# Patient Record
Sex: FEMALE | Race: ASIAN | URBAN - METROPOLITAN AREA
[De-identification: names, ages, dates, MRNs, and addresses within clinical notes are randomized per-mention and may not be internally consistent; named-entity substitution may affect disease eponyms.]

---

## 2022-12-16 ENCOUNTER — OFFICE VISIT (OUTPATIENT)
Dept: OBGYN CLINIC | Facility: CLINIC | Age: 42
End: 2022-12-16
Payer: COMMERCIAL

## 2022-12-16 VITALS
HEIGHT: 64 IN | HEART RATE: 71 BPM | DIASTOLIC BLOOD PRESSURE: 77 MMHG | SYSTOLIC BLOOD PRESSURE: 116 MMHG | BODY MASS INDEX: 26.46 KG/M2 | WEIGHT: 155 LBS

## 2022-12-16 DIAGNOSIS — Z01.419 WELL WOMAN EXAM WITH ROUTINE GYNECOLOGICAL EXAM: Primary | ICD-10-CM

## 2022-12-16 DIAGNOSIS — Z31.41 FERTILITY TESTING: ICD-10-CM

## 2022-12-16 DIAGNOSIS — N94.10 DYSPAREUNIA IN FEMALE: ICD-10-CM

## 2022-12-16 PROCEDURE — 87624 HPV HI-RISK TYP POOLED RSLT: CPT

## 2022-12-16 PROCEDURE — 3078F DIAST BP <80 MM HG: CPT

## 2022-12-16 PROCEDURE — 99204 OFFICE O/P NEW MOD 45 MIN: CPT

## 2022-12-16 PROCEDURE — 3074F SYST BP LT 130 MM HG: CPT

## 2022-12-16 PROCEDURE — 3008F BODY MASS INDEX DOCD: CPT

## 2022-12-16 NOTE — PROGRESS NOTES
Jose Pathak is a 38year old female  Patient's last menstrual period was 2022 (exact date). Patient presents with:  New Patient: Patient was late and is filling out paperwork   Wellness Visit  Other: Discuss future pregnancy   . Pt has been with same male partner for over 10 years. She has not had sexual intercourse with him. She has tried and it is too painful. She is interested in getting pregnant. Discussed with her pelvic floor therapy and fertility options. OBSTETRICS HISTORY:  OB History    Para Term  AB Living   0 0 0 0 0 0   SAB IAB Ectopic Multiple Live Births   0 0 0 0 0       GYNE HISTORY:  Periods regular monthly every 28-30 days, lasting every 5-7 days. Sexual activity: Yes   Partners: Male        Pap Date:  (might be two years ago)        MEDICAL HISTORY:  History reviewed. No pertinent past medical history. SURGICAL HISTORY:  History reviewed. No pertinent surgical history. SOCIAL HISTORY:  Social History    Socioeconomic History      Marital status: Not on file      Spouse name: Not on file      Number of children: Not on file      Years of education: Not on file      Highest education level: Not on file    Occupational History      Not on file    Tobacco Use      Smoking status: Never      Smokeless tobacco: Never    Vaping Use      Vaping Use: Never used    Substance and Sexual Activity      Alcohol use: Yes        Comment: socially      Drug use: Never      Sexual activity: Yes        Partners: Male    Other Topics      Concerns:        Not on file    Social History Narrative      Not on file    Social Determinants of Health  Financial Resource Strain: Not on file  Food Insecurity: Not on file  Transportation Needs: Not on file  Physical Activity: Not on file  Stress: Not on file  Social Connections: Not on file  Intimate Partner Violence: Not on file  Housing Stability: Not on file    FAMILY HISTORY:  History reviewed.  No pertinent family history. MEDICATIONS:  No current outpatient medications on file. ALLERGIES:    Insect Parts            SHORTNESS OF BREATH, SWELLING    Comment:COCKROACH  Sulfa Antibiotics       SHORTNESS OF BREATH    Comment:touching tabs while mixing meds ? ???  Latex                   RASH    Comment:gloves      PHYSICAL EXAM:   Pelvic Exam:  External Genitalia: normal appearance, hair distribution, and no lesions  Urethral Meatus:  normal in size, location, without lesions and prolapse  Bladder:  No fullness, masses or tenderness  Vagina:  Normal appearance without lesions, no abnormal discharge  Cervix:  Normal without tenderness on motion  Uterus: normal in size, contour, position, mobility, without tenderness  Adnexa: normal without masses or tenderness  Perineum: normal  Anus: no hemorroids     Assessment & Plan:    . Well woman exam with routine gynecological exam    - HPV HIGH RISK , THIN PREP COLLECTION; Future  - THINPREP PAP SMEAR B; Future  - CBC WITH DIFFERENTIAL WITH PLATELET; Future  - COMP METABOLIC PANEL (14); Future  - LIPID PANEL; Future  - ASSAY, THYROID STIM HORMONE; Future  - VITAMIN D, 25-HYDROXY; Future  - [facility] JIGNESH 2D+3D SCREENING BILAT (CPT=77067/89860); Future  - HPV HIGH RISK , THIN PREP COLLECTION  - THINPREP PAP SMEAR B    2. Fertility testing    - [facility]; Future  - LH (LUTEINIZING HORMONE); Future  - ANTI-MULLERIAN HORMONE; Future    3.  Dyspareunia in female    - [address] [place] [address]

## 2022-12-19 LAB — HPV I/H RISK 1 DNA SPEC QL NAA+PROBE: NEGATIVE

## 2022-12-29 ENCOUNTER — LAB ENCOUNTER (OUTPATIENT)
Dept: LAB | Age: 42
End: 2022-12-29
Payer: COMMERCIAL

## 2022-12-29 DIAGNOSIS — Z31.41 FERTILITY TESTING: ICD-10-CM

## 2022-12-29 DIAGNOSIS — Z01.419 WELL WOMAN EXAM WITH ROUTINE GYNECOLOGICAL EXAM: ICD-10-CM

## 2022-12-29 LAB
ALBUMIN SERPL-MCNC: 4.1 G/DL (ref 3.4–5)
ALBUMIN/GLOB SERPL: 1.2 {RATIO} (ref 1–2)
ALP LIVER SERPL-CCNC: 50 U/L
ALT SERPL-CCNC: 27 U/L
ANION GAP SERPL CALC-SCNC: 5 MMOL/L (ref 0–18)
AST SERPL-CCNC: 16 U/L (ref 15–37)
BASOPHILS # BLD AUTO: 0.04 X10(3) UL (ref 0–0.2)
BASOPHILS NFR BLD AUTO: 0.8 %
BILIRUB SERPL-MCNC: 0.6 MG/DL (ref 0.1–2)
BUN BLD-MCNC: 11 MG/DL (ref 7–18)
BUN/CREAT SERPL: 16.7 (ref 10–20)
CALCIUM BLD-MCNC: 9.3 MG/DL (ref 8.5–10.1)
CHLORIDE SERPL-SCNC: 105 MMOL/L (ref 98–112)
CHOLEST SERPL-MCNC: 159 MG/DL (ref ?–200)
CO2 SERPL-SCNC: 31 MMOL/L (ref 21–32)
CREAT BLD-MCNC: 0.66 MG/DL
DEPRECATED RDW RBC AUTO: 41.7 FL (ref 35.1–46.3)
EOSINOPHIL # BLD AUTO: 0.04 X10(3) UL (ref 0–0.7)
EOSINOPHIL NFR BLD AUTO: 0.8 %
ERYTHROCYTE [DISTWIDTH] IN BLOOD BY AUTOMATED COUNT: 12.3 % (ref 11–15)
FASTING PATIENT LIPID ANSWER: YES
FASTING STATUS PATIENT QL REPORTED: YES
FSH SERPL-ACNC: 6.3 MIU/ML
GFR SERPLBLD BASED ON 1.73 SQ M-ARVRAT: 114 ML/MIN/1.73M2 (ref 60–?)
GLOBULIN PLAS-MCNC: 3.4 G/DL (ref 2.8–4.4)
GLUCOSE BLD-MCNC: 93 MG/DL (ref 70–99)
HCT VFR BLD AUTO: 43 %
HDLC SERPL-MCNC: 69 MG/DL (ref 40–59)
HGB BLD-MCNC: 14 G/DL
IMM GRANULOCYTES # BLD AUTO: 0.01 X10(3) UL (ref 0–1)
IMM GRANULOCYTES NFR BLD: 0.2 %
LDLC SERPL CALC-MCNC: 70 MG/DL (ref ?–100)
LH SERPL-ACNC: 4.6 MIU/ML
LYMPHOCYTES # BLD AUTO: 1.45 X10(3) UL (ref 1–4)
LYMPHOCYTES NFR BLD AUTO: 29.2 %
MCH RBC QN AUTO: 29.9 PG (ref 26–34)
MCHC RBC AUTO-ENTMCNC: 32.6 G/DL (ref 31–37)
MCV RBC AUTO: 91.9 FL
MONOCYTES # BLD AUTO: 0.24 X10(3) UL (ref 0.1–1)
MONOCYTES NFR BLD AUTO: 4.8 %
NEUTROPHILS # BLD AUTO: 3.19 X10 (3) UL (ref 1.5–7.7)
NEUTROPHILS # BLD AUTO: 3.19 X10(3) UL (ref 1.5–7.7)
NEUTROPHILS NFR BLD AUTO: 64.2 %
NONHDLC SERPL-MCNC: 90 MG/DL (ref ?–130)
OSMOLALITY SERPL CALC.SUM OF ELEC: 291 MOSM/KG (ref 275–295)
PLATELET # BLD AUTO: 299 10(3)UL (ref 150–450)
POTASSIUM SERPL-SCNC: 4.2 MMOL/L (ref 3.5–5.1)
PROT SERPL-MCNC: 7.5 G/DL (ref 6.4–8.2)
RBC # BLD AUTO: 4.68 X10(6)UL
SODIUM SERPL-SCNC: 141 MMOL/L (ref 136–145)
TRIGL SERPL-MCNC: 114 MG/DL (ref 30–149)
TSI SER-ACNC: 1.89 MIU/ML (ref 0.36–3.74)
VIT D+METAB SERPL-MCNC: 24 NG/ML (ref 30–100)
VLDLC SERPL CALC-MCNC: 17 MG/DL (ref 0–30)
WBC # BLD AUTO: 5 X10(3) UL (ref 4–11)

## 2022-12-29 PROCEDURE — 80053 COMPREHEN METABOLIC PANEL: CPT

## 2022-12-29 PROCEDURE — 83002 ASSAY OF GONADOTROPIN (LH): CPT

## 2022-12-29 PROCEDURE — 82306 VITAMIN D 25 HYDROXY: CPT

## 2022-12-29 PROCEDURE — 85025 COMPLETE CBC W/AUTO DIFF WBC: CPT

## 2022-12-29 PROCEDURE — 80061 LIPID PANEL: CPT

## 2022-12-29 PROCEDURE — 84443 ASSAY THYROID STIM HORMONE: CPT

## 2022-12-29 PROCEDURE — 83520 IMMUNOASSAY QUANT NOS NONAB: CPT

## 2022-12-29 PROCEDURE — 83001 ASSAY OF GONADOTROPIN (FSH): CPT

## 2022-12-31 LAB — ANTI-MULLERIAN HORMONE: 5.63 NG/ML

## 2023-01-06 ENCOUNTER — OFFICE VISIT (OUTPATIENT)
Dept: FAMILY MEDICINE CLINIC | Facility: CLINIC | Age: 43
End: 2023-01-06
Payer: COMMERCIAL

## 2023-01-06 VITALS — HEIGHT: 65.5 IN | BODY MASS INDEX: 24.85 KG/M2 | WEIGHT: 151 LBS

## 2023-01-06 DIAGNOSIS — Z00.00 WELLNESS EXAMINATION: Primary | ICD-10-CM

## 2023-01-06 DIAGNOSIS — N94.10 DYSPAREUNIA IN FEMALE: ICD-10-CM

## 2023-01-06 DIAGNOSIS — L81.9 HYPOPIGMENTATION: ICD-10-CM

## 2023-01-06 PROBLEM — M25.569 KNEE PAIN: Status: ACTIVE | Noted: 2021-05-11

## 2023-01-06 PROBLEM — M76.829 TIBIALIS TENDINITIS: Status: ACTIVE | Noted: 2021-05-11

## 2023-01-06 PROBLEM — E55.9 VITAMIN D DEFICIENCY: Status: ACTIVE | Noted: 2021-05-12

## 2023-01-06 PROBLEM — H00.019 HORDEOLUM EXTERNUM: Status: ACTIVE | Noted: 2021-05-11

## 2023-01-06 PROBLEM — H52.13 MYOPIA OF BOTH EYES: Status: ACTIVE | Noted: 2021-07-09

## 2023-01-06 PROCEDURE — 99386 PREV VISIT NEW AGE 40-64: CPT | Performed by: STUDENT IN AN ORGANIZED HEALTH CARE EDUCATION/TRAINING PROGRAM

## 2023-01-06 PROCEDURE — 3008F BODY MASS INDEX DOCD: CPT | Performed by: STUDENT IN AN ORGANIZED HEALTH CARE EDUCATION/TRAINING PROGRAM

## 2023-01-06 RX ORDER — CHOLECALCIFEROL (VITAMIN D3) 1250 MCG
CAPSULE ORAL
COMMUNITY

## 2023-01-06 RX ORDER — UBIDECARENONE 75 MG
250 CAPSULE ORAL DAILY
COMMUNITY

## 2023-01-06 NOTE — PATIENT INSTRUCTIONS
Ob/gyn  - Dr. Marquise Albrecht   - Dr. Sima Pozo and [facility] Woody Sawyer Dr. Jaden Crespo

## 2023-01-10 ENCOUNTER — MED REC SCAN ONLY (OUTPATIENT)
Dept: FAMILY MEDICINE CLINIC | Facility: CLINIC | Age: 43
End: 2023-01-10

## 2023-01-17 ENCOUNTER — TELEPHONE (OUTPATIENT)
Facility: CLINIC | Age: 43
End: 2023-01-17

## 2023-03-14 ENCOUNTER — LAB ENCOUNTER (OUTPATIENT)
Dept: LAB | Age: 43
End: 2023-03-14
Attending: OBSTETRICS & GYNECOLOGY
Payer: COMMERCIAL

## 2023-03-14 ENCOUNTER — OFFICE VISIT (OUTPATIENT)
Dept: OBGYN CLINIC | Facility: CLINIC | Age: 43
End: 2023-03-14

## 2023-03-14 VITALS
SYSTOLIC BLOOD PRESSURE: 120 MMHG | HEIGHT: 65.5 IN | BODY MASS INDEX: 25 KG/M2 | HEART RATE: 80 BPM | DIASTOLIC BLOOD PRESSURE: 76 MMHG

## 2023-03-14 DIAGNOSIS — O20.9 BLEEDING IN EARLY PREGNANCY: ICD-10-CM

## 2023-03-14 DIAGNOSIS — O20.9 BLEEDING IN EARLY PREGNANCY: Primary | ICD-10-CM

## 2023-03-14 LAB
B-HCG SERPL-ACNC: 2557 MIU/ML
PROGEST SERPL-MCNC: 6.1 NG/ML
RH BLOOD TYPE: POSITIVE

## 2023-03-14 PROCEDURE — 86900 BLOOD TYPING SEROLOGIC ABO: CPT

## 2023-03-14 PROCEDURE — 84702 CHORIONIC GONADOTROPIN TEST: CPT

## 2023-03-14 PROCEDURE — 86901 BLOOD TYPING SEROLOGIC RH(D): CPT

## 2023-03-14 PROCEDURE — 3074F SYST BP LT 130 MM HG: CPT | Performed by: OBSTETRICS & GYNECOLOGY

## 2023-03-14 PROCEDURE — 99203 OFFICE O/P NEW LOW 30 MIN: CPT | Performed by: OBSTETRICS & GYNECOLOGY

## 2023-03-14 PROCEDURE — 84144 ASSAY OF PROGESTERONE: CPT

## 2023-03-14 PROCEDURE — 3078F DIAST BP <80 MM HG: CPT | Performed by: OBSTETRICS & GYNECOLOGY

## 2023-03-15 ENCOUNTER — TELEPHONE (OUTPATIENT)
Dept: OBGYN CLINIC | Facility: CLINIC | Age: 43
End: 2023-03-15

## 2023-03-15 DIAGNOSIS — O20.9 BLEEDING IN EARLY PREGNANCY: Primary | ICD-10-CM

## 2023-03-15 RX ORDER — PROGESTERONE 200 MG/1
200 CAPSULE ORAL DAILY
Qty: 90 CAPSULE | Refills: 1 | Status: SHIPPED | OUTPATIENT
Start: 2023-03-15

## 2023-03-15 RX ORDER — PROGESTERONE 200 MG/1
200 CAPSULE ORAL AS DIRECTED
Qty: 90 CAPSULE | Refills: 1 | Status: SHIPPED | OUTPATIENT
Start: 2023-03-15 | End: 2023-03-15

## 2023-03-15 NOTE — TELEPHONE ENCOUNTER
Spoke with patient regarding lab results. Appointment scheduled for OV Friday. No further concerns at this time.     Morteza Fuentes

## 2023-03-15 NOTE — TELEPHONE ENCOUNTER
Spoke with pharmacy. rx missing frequency of progesterone. Reynold Rather informed. New rx will be sent in.

## 2023-03-16 ENCOUNTER — LAB ENCOUNTER (OUTPATIENT)
Dept: LAB | Age: 43
End: 2023-03-16
Attending: OBSTETRICS & GYNECOLOGY
Payer: COMMERCIAL

## 2023-03-16 DIAGNOSIS — O20.9 BLEEDING IN EARLY PREGNANCY: ICD-10-CM

## 2023-03-16 LAB
B-HCG SERPL-ACNC: 2854 MIU/ML
PROGEST SERPL-MCNC: 31.8 NG/ML

## 2023-03-16 PROCEDURE — 84144 ASSAY OF PROGESTERONE: CPT

## 2023-03-16 PROCEDURE — 84702 CHORIONIC GONADOTROPIN TEST: CPT

## 2023-03-17 ENCOUNTER — OFFICE VISIT (OUTPATIENT)
Dept: OBGYN CLINIC | Facility: CLINIC | Age: 43
End: 2023-03-17

## 2023-03-17 VITALS — WEIGHT: 152.19 LBS | DIASTOLIC BLOOD PRESSURE: 65 MMHG | BODY MASS INDEX: 25 KG/M2 | SYSTOLIC BLOOD PRESSURE: 103 MMHG

## 2023-03-17 DIAGNOSIS — O20.9 BLEEDING IN EARLY PREGNANCY: Primary | ICD-10-CM

## 2023-03-21 ENCOUNTER — TELEPHONE (OUTPATIENT)
Dept: OBGYN CLINIC | Facility: CLINIC | Age: 43
End: 2023-03-21

## 2023-03-21 NOTE — TELEPHONE ENCOUNTER
Called and left voicemail for patient regarding having repeat HCG and progesterone drawn. Labs are entered.

## 2023-03-21 NOTE — TELEPHONE ENCOUNTER
----- Message from Carlos Hartman MD sent at 3/18/2023 12:25 PM CDT -----  Repeat hcg and progesterone on Tuesday Carlos Hartman MD

## 2023-03-23 ENCOUNTER — PATIENT MESSAGE (OUTPATIENT)
Dept: OBGYN CLINIC | Facility: CLINIC | Age: 43
End: 2023-03-23

## 2023-03-23 ENCOUNTER — LAB ENCOUNTER (OUTPATIENT)
Dept: LAB | Age: 43
End: 2023-03-23
Attending: OBSTETRICS & GYNECOLOGY
Payer: COMMERCIAL

## 2023-03-23 DIAGNOSIS — O20.9 BLEEDING IN EARLY PREGNANCY: ICD-10-CM

## 2023-03-23 LAB
B-HCG SERPL-ACNC: 2816 MIU/ML
PROGEST SERPL-MCNC: 73.7 NG/ML

## 2023-03-23 PROCEDURE — 84144 ASSAY OF PROGESTERONE: CPT

## 2023-03-23 PROCEDURE — 84702 CHORIONIC GONADOTROPIN TEST: CPT

## 2023-03-24 ENCOUNTER — PATIENT MESSAGE (OUTPATIENT)
Dept: OBGYN CLINIC | Facility: CLINIC | Age: 43
End: 2023-03-24

## 2023-03-24 ENCOUNTER — TELEPHONE (OUTPATIENT)
Dept: OBGYN CLINIC | Facility: CLINIC | Age: 43
End: 2023-03-24

## 2023-03-24 DIAGNOSIS — O20.9 BLEEDING IN EARLY PREGNANCY: Primary | ICD-10-CM

## 2023-03-24 RX ORDER — MISOPROSTOL 200 UG/1
200 TABLET ORAL 4 TIMES DAILY
Qty: 8 TABLET | Refills: 0 | Status: SHIPPED | OUTPATIENT
Start: 2023-03-24

## 2023-03-24 NOTE — TELEPHONE ENCOUNTER
Patient name and  verified. Patient informed of TA result note and recommendations. Reviewed Cytotec dosing and administration with patient. Standing order for bhcg lab entered. All questions answered.

## 2023-03-24 NOTE — TELEPHONE ENCOUNTER
From: Ronda Hadley  To: Abrahan Almonte MD  Sent: 3/24/2023 8:17 AM CDT  Subject: Cytotec medication not at pharmacy    Dear Dr. Cornelius Sagastume,    Good morning. Thank you for taking care of me . I really appreciate your help during this difficult time. I have a question about the cytotec treatment. I got a message from your office about my hCG level not rising and Cytotec treatment is necessary. I was told to go to the pharmacy to . I believed this is urgent so I went to the pharmacy but the pharmacy didn't have any prescription from you yesterday. I wonder if there is a mistake and what is the safest and most logical timeline for me to start this treatment process without affecting my work and life. Thank you very much for your help.      Sincerely,  Bacilio Mota

## 2023-04-05 ENCOUNTER — LAB ENCOUNTER (OUTPATIENT)
Dept: LAB | Age: 43
End: 2023-04-05
Attending: OBSTETRICS & GYNECOLOGY
Payer: COMMERCIAL

## 2023-04-05 DIAGNOSIS — O20.9 BLEEDING IN EARLY PREGNANCY: ICD-10-CM

## 2023-04-05 LAB — B-HCG SERPL-ACNC: 13 MIU/ML

## 2023-04-05 PROCEDURE — 84702 CHORIONIC GONADOTROPIN TEST: CPT

## 2023-04-10 ENCOUNTER — TELEPHONE (OUTPATIENT)
Dept: OBGYN CLINIC | Facility: CLINIC | Age: 43
End: 2023-04-10

## 2023-04-10 DIAGNOSIS — O02.1 MISSED ABORTION: Primary | ICD-10-CM

## 2023-04-10 NOTE — TELEPHONE ENCOUNTER
----- Message from Lisa Vázquez MD sent at 4/6/2023  5:08 PM CDT -----  Dropping HCG -  Have patient repeat HCG weekly till < 2.     Lisa Vázquez MD

## 2023-04-10 NOTE — TELEPHONE ENCOUNTER
Called and spoke with patient and informed her HCG is dropping and for her to have blood drawn(HCG) again on Wednesday. Informed patient we will continue to monitor until her numbers are less than 2. Patient verbalized understanding.

## 2023-06-13 ENCOUNTER — TELEPHONE (OUTPATIENT)
Dept: FAMILY MEDICINE CLINIC | Facility: CLINIC | Age: 43
End: 2023-06-13

## 2023-06-13 ENCOUNTER — E-VISIT (OUTPATIENT)
Dept: TELEHEALTH | Age: 43
End: 2023-06-13
Payer: COMMERCIAL

## 2023-06-13 ENCOUNTER — PATIENT MESSAGE (OUTPATIENT)
Dept: FAMILY MEDICINE CLINIC | Facility: CLINIC | Age: 43
End: 2023-06-13

## 2023-06-13 DIAGNOSIS — L73.9 FOLLICULITIS: Primary | ICD-10-CM

## 2023-06-13 DIAGNOSIS — L50.9 HIVES: Primary | ICD-10-CM

## 2023-06-13 PROCEDURE — 99422 OL DIG E/M SVC 11-20 MIN: CPT | Performed by: NURSE PRACTITIONER

## 2023-06-13 NOTE — TELEPHONE ENCOUNTER
From: Augustin Quintero  To: Margot Anne MD  Sent: 6/13/2023 12:58 PM CDT  Subject: hives/chicken pox/what to do? I am having hives/small bumps all over my body (It was only few on Saturday but the number keeps increasing, I have 30-40 bumps so far). Most of them are on my torso and few on the arms and legs. I wonder if I should get tested for chicken pox or get a allergy test. Thank you.

## 2023-06-13 NOTE — PATIENT INSTRUCTIONS
Antimicrobial cleanser such as cleanser with benzoyl peroxide  Medication as prescribed  Seek in person evaluation for any worsening symptoms or if not improving over the next several days.

## 2023-06-13 NOTE — TELEPHONE ENCOUNTER
Pt called abnd c/o red, itchy, raised very small bumps all over her neck, torso and legs. Pt states symptoms started Sat 6/10/23 and it's progressively worsening. Pt denies fever, Sob/chestpian. Pt is concerned of possible Chicken Pox vs Monkey Pox. Pt reported having Hx of Chicken Pox when she was a kid. Advised Pt to go to the ED for new/worsening symptoms to get evaluated. Pt is aware Dr. Lakeisha Martin is out of office today. Pt verbalized understanding and states she would rather wait for Dr. Carlie Deal recommendation.

## 2023-06-14 ENCOUNTER — HOSPITAL ENCOUNTER (OUTPATIENT)
Age: 43
Discharge: HOME | End: 2023-06-14
Attending: EMERGENCY MEDICINE
Payer: COMMERCIAL

## 2023-06-14 VITALS
TEMPERATURE: 98 F | RESPIRATION RATE: 16 BRPM | SYSTOLIC BLOOD PRESSURE: 112 MMHG | OXYGEN SATURATION: 98 % | HEART RATE: 73 BPM | DIASTOLIC BLOOD PRESSURE: 67 MMHG

## 2023-06-14 DIAGNOSIS — L50.9 HIVES: Primary | ICD-10-CM

## 2023-06-14 PROCEDURE — 99213 OFFICE O/P EST LOW 20 MIN: CPT

## 2023-06-14 PROCEDURE — 99203 OFFICE O/P NEW LOW 30 MIN: CPT

## 2023-06-14 RX ORDER — PREDNISONE 20 MG/1
40 TABLET ORAL DAILY
Qty: 10 TABLET | Refills: 0 | Status: SHIPPED | OUTPATIENT
Start: 2023-06-14 | End: 2023-06-19

## 2023-06-14 NOTE — DISCHARGE INSTRUCTIONS
Take benadryl 25 mg every 4-6 hours as needed until rash resolved  Take pepcid 20 mg twice a day over the counter until rash resolved  Take Prednisone 40 mg once a day for 5 days  Return if any worsening symptoms or new concerns  Follow up with [facility] if symptoms persist.

## 2023-06-14 NOTE — ED INITIAL ASSESSMENT (HPI)
C/o rashes/hives started to both arms and legs on Saturday. Yesterday increasing- noted to chest, abdomen and back area. Denies short of breath.

## 2023-06-14 NOTE — TELEPHONE ENCOUNTER
See below and notes from  today. Are you willing to place referral order? They [facility]. I pended for Dr Angle Mendieta there?   Please advise x

## 2023-12-08 ENCOUNTER — TELEPHONE (OUTPATIENT)
Dept: OBGYN CLINIC | Facility: CLINIC | Age: 43
End: 2023-12-08

## 2023-12-08 NOTE — TELEPHONE ENCOUNTER
Patient would like a sooner appointment than available for missed menses . FDLP : 11/2/23. Would also like to know if blood work can just be ordered to confirm pregnancy. Please advise.

## 2023-12-12 NOTE — TELEPHONE ENCOUNTER
Patient verified name and     Patient states she no longer needs missed menses appt, has now started cycle. Patient did not have +UPT. Patient would like an appointment for annual exam, states she needs it before the year ends and okay seeing another provider. Scheduled tomorrow with RALPH. Aware of scheduling details/location.

## 2023-12-13 ENCOUNTER — OFFICE VISIT (OUTPATIENT)
Dept: OBGYN CLINIC | Facility: CLINIC | Age: 43
End: 2023-12-13
Payer: COMMERCIAL

## 2023-12-13 VITALS
DIASTOLIC BLOOD PRESSURE: 90 MMHG | HEIGHT: 66 IN | BODY MASS INDEX: 24.67 KG/M2 | SYSTOLIC BLOOD PRESSURE: 134 MMHG | HEART RATE: 87 BPM | WEIGHT: 153.5 LBS

## 2023-12-13 DIAGNOSIS — Z01.818 PREPROCEDURAL EXAMINATION: ICD-10-CM

## 2023-12-13 DIAGNOSIS — N97.9 FEMALE INFERTILITY: ICD-10-CM

## 2023-12-13 DIAGNOSIS — N84.1 MUCOUS POLYP OF CERVIX: ICD-10-CM

## 2023-12-13 DIAGNOSIS — Z01.419 NORMAL GYNECOLOGIC EXAMINATION: Primary | ICD-10-CM

## 2023-12-13 LAB
CONTROL LINE PRESENT WITH A CLEAR BACKGROUND (YES/NO): YES YES/NO
KIT LOT #: NORMAL NUMERIC
PREGNANCY TEST, URINE: NEGATIVE

## 2023-12-13 PROCEDURE — 88305 TISSUE EXAM BY PATHOLOGIST: CPT | Performed by: OBSTETRICS & GYNECOLOGY

## 2023-12-13 PROCEDURE — 87491 CHLMYD TRACH DNA AMP PROBE: CPT | Performed by: OBSTETRICS & GYNECOLOGY

## 2023-12-13 PROCEDURE — 87591 N.GONORRHOEAE DNA AMP PROB: CPT | Performed by: OBSTETRICS & GYNECOLOGY

## 2023-12-13 NOTE — PROCEDURES
Endometrial Biopsy     Pre-Procedure Care:   Consent was obtained. Procedure/risks were explained. Questions were answered. Correct patient was identified. Pregnancy Results: negative from urine test     Description of Procedure:   A bivalve speculum was placed in the vagina. Using a ring forceps I grasped the polyp and twisted and pulled towards me. There was good hemostasis. There was slight oozing from the ectocervix and I placed silver nitrate there and then good hemostasis was noted  Specimen was sent to pathology as a cervical polyp  There were no complications. There was no blood loss. Discharge instructions were provided to the patient. none

## 2023-12-13 NOTE — PROGRESS NOTES
Joselito Rubio is a 41year old female  Patient's last menstrual period was 2023 (exact date). Chief Complaint   Patient presents with    Annual   Annual pap exam. Trying to get pregnant >1  year. Pt requesting fsh, lh , infertility  labs    OBSTETRICS HISTORY:     OB History    Para Term  AB Living   1 0 0 0 1 0   SAB IAB Ectopic Multiple Live Births   1 0 0 0 0      # Outcome Date GA Lbr Arsalan/2nd Weight Sex Delivery Anes PTL Lv   1 SAB 2023               GYNE HISTORY:     Hx Prior Abnormal Pap: No  Pap Date: 22  Pap Result Notes: Normal   Menarche: 17years old (2023 11:02 AM)  Period Cycle (Days): 31 days (2023 11:02 AM)  Period Duration (Days): 7 days (2023 11:02 AM)  Period Flow: Moderate (2023 11:02 AM)  Use of Birth Control (if yes, specify type): None (2023 11:02 AM)  Date When Birth Control Last Used: wanting to get pregnant (2022 12:11 PM)  Hx Prior Abnormal Pap: No (2023 11:02 AM)  Pap Date: 22 (2023 11:02 AM)  Pap Result Notes: Normal (2023 11:02 AM)        Latest Ref Rng & Units 2022    12:46 PM   RECENT PAP RESULTS   Thinprep Pap Negative for intraepithelial lesion or malignancy Negative for intraepithelial lesion or malignancy    HPV Negative Negative          MEDICAL HISTORY:     History reviewed. No pertinent past medical history. SURGICAL HISTORY:     History reviewed. No pertinent surgical history.     SOCIAL HISTORY:     Social History     Socioeconomic History    Marital status: Single   Tobacco Use    Smoking status: Never    Smokeless tobacco: Never   Vaping Use    Vaping Use: Never used   Substance and Sexual Activity    Alcohol use: Yes     Comment: socially    Drug use: Never    Sexual activity: Yes     Partners: Male        FAMILY HISTORY:     Family History   Problem Relation Age of Onset    Heart Attack Father 61    Hypertension Father     No Known Problems Mother         Breast tissue removal possibly cancerous cells       MEDICATIONS:       Current Outpatient Medications:     cyanocobalamin 100 MCG Oral Tab, Take 2.5 tablets (250 mcg total) by mouth daily. , Disp: , Rfl:     Cholecalciferol (VITAMIN D3) 1.25 MG (68425 UT) Oral Cap, Take by mouth., Disp: , Rfl:     miSOPROStol (CYTOTEC) 200 MCG Oral Tab, Take 1 tablet (200 mcg total) by mouth 4 (four) times daily. (Patient not taking: Reported on 6/14/2023), Disp: 8 tablet, Rfl: 0    progesterone 200 MG Oral Cap, Take 1 capsule (200 mg total) by mouth daily. (Patient not taking: Reported on 6/14/2023), Disp: 90 capsule, Rfl: 1    ALLERGIES:       Allergies   Allergen Reactions    Insect Parts SHORTNESS OF BREATH and SWELLING     COCKROACH    Sulfa Antibiotics SHORTNESS OF BREATH     touching tabs while mixing meds ????  Other reaction(s): throat closed  Believes it was a sulfa drug, unsure, just from touching it her throat closed, fixed with benedryl before going to hospital  touching tabs while mixing meds ? ???    Latex RASH     gloves         REVIEW OF SYSTEMS:     Constitutional:    denies fever / chills  Eyes:     denies blurred or double vision  Cardiovascular:  denies chest pain or palpitations  Respiratory:    denies shortness of breath  Gastrointestinal:  denies severe abdominal pain, frequent diarrhea or constipation, nausea / vomiting  Genitourinary:    denies dysuria, bothersome incontinence  Skin/Breast:   denies any breast pain, lumps, or discharge  Neurological:    denies frequent severe headaches  Psychiatric:   denies depression or anxiety, thoughts of harming self or others  Heme/Lymph:    denies easy bruising or bleeding      PHYSICAL EXAM:   Blood pressure 134/90, pulse 87, height 5' 6\" (1.676 m), weight 153 lb 8 oz (69.6 kg), last menstrual period 12/08/2023, not currently breastfeeding.   Constitutional:  well developed, well nourished  Head/Face:  normocephalic  Neck/Thyroid: thyroid symmetric, no thyromegaly, no nodules, no adenopathy  Lymphatic: no abnormal supraclavicular or axillary adenopathy is noted  Respiratory:      chest wall symmetric and nontender on palpation, clear to asculation bilateral, no wheezing, rales, ronchi, and resonance normal upon percussion  Cardiovascular: chest normal in appearance, regular rate and rhythm, no murmurs, PMI palpated midclavicular line  Breast:   normal without palpable masses, tenderness, asymmetry, nipple discharge, nipple retraction or skin changes  Abdomen:   soft, nontender, nondistended, no masses  Skin/Hair:  no unusual rashes or bruises  Extremities:  no edema, no cyanosis, non tender bilaterally  Psychiatric:   oriented to time, place, person and situation. Appropriate mood and affect    Pelvic Exam:  External Genitalia:  normal appearance, hair distribution, and no lesions  Urethral Meatus:   normal in size, location, without lesions   Bladder:    no fullness, masses or tenderness  Vagina:    normal appearance without lesions, no abnormal discharge, positive menses. Cervix:    No lesions, normal friability , positive polyp 3mm  Uterus:    normal in size, 8 wk sized, normal contour, position, mobility, without  motion tenderness  Adnexa:   normal without masses or tenderness  Perineum:   normal  Anus: no hemorroids         ASSESSMENT & PLAN:     Rocio Norman was seen today for annual.    Diagnoses and all orders for this visit:    Normal gynecologic examination  -     ThinPrep Pap with HPV Reflex, Chlamydia/GC; Future  -     Chlamydia/Gc Amplification; Future  Nutrition, weight screening and exercise were discussed with the patient. Exercise should encompass approximately 150 minutes/week. Self breast exam counseling was also given. I advised the patient to avoid tobacco, drugs and alcohol. Influenza vaccine was offered if seasonally appropriate. HPV and STD prevention counseling was given.   Health maintenance checklist  was reviewed including Pap smear, cervical cultures, and mammogram screening if age-appropriate. Appropriate follow-up scheduling was discussed with the patient. Preprocedural examination  -     POC Urine pregnancy test [86112]    Mucous polyp of cervix  -     BIOPSY/EXCIS CERVICAL LESN  -     Specimen to Pathology Tissue; Future  Cervical polypectomy done  Female infertility  -     [address] [place] [address]; Future  -     LH (Luteinizing Hormone);  Future  -     REPRODUCTIVE ENDOCRINOLOGY - EXTERNAL  I told her to please get a day 3 FSH and LH and I still recommend she see an infertility specialist despite the level of the [address] [place] [address] T3        FOLLOW-UP     Return in about 1 year (around 12/13/2024) for Annual exam.      Marcelo Chambers MD  12/13/2023

## 2023-12-14 LAB
C TRACH DNA SPEC QL NAA+PROBE: NEGATIVE
N GONORRHOEA DNA SPEC QL NAA+PROBE: NEGATIVE

## 2023-12-19 LAB — HPV I/H RISK 1 DNA SPEC QL NAA+PROBE: NEGATIVE

## 2024-01-14 ENCOUNTER — PATIENT MESSAGE (OUTPATIENT)
Dept: OBGYN CLINIC | Facility: CLINIC | Age: 44
End: 2024-01-14

## 2024-01-14 DIAGNOSIS — N92.6 IRREGULAR MENSES: Primary | ICD-10-CM

## 2024-01-14 DIAGNOSIS — N97.9 FEMALE INFERTILITY: Primary | ICD-10-CM

## 2024-01-14 DIAGNOSIS — N94.10 DYSPAREUNIA IN FEMALE: ICD-10-CM

## 2024-01-15 ENCOUNTER — PATIENT MESSAGE (OUTPATIENT)
Dept: OBGYN CLINIC | Facility: CLINIC | Age: 44
End: 2024-01-15

## 2024-01-15 DIAGNOSIS — N97.9 FEMALE INFERTILITY: Primary | ICD-10-CM

## 2024-01-15 NOTE — TELEPHONE ENCOUNTER
From: Margy Lawson  To: Hernan Baird  Sent: 1/15/2024 11:20 AM CST  Subject: referral     Dear Dr. Baird,  Thank you for sending the referral for me on Dec 13, 2023. Could you please change the doctor's name for me? I would like to see Dr. Sang Gaona at [address] [place] [address]. Thank you very much and have a nice day!    Best regard,  Margy

## 2024-01-15 NOTE — TELEPHONE ENCOUNTER
From: Margy Lawson  To: Hernan Baird  Sent: 1/14/2024 4:51 PM CST  Subject: Question about blood test ordered    Hi Dr. Baird,    It was nice meeting you on 12/13/23. Thank you for taking samples for my annual ob-gyn exams. I see you ordered the LH and FSH blood test for infertility for me. Could you please add AMH, E2,progesterone and TSH to the order.     Thank you and have a nice day  Margy

## 2024-01-15 NOTE — TELEPHONE ENCOUNTER
Ok to order LH, FSH, TSH, AMH, E2, cortisol, testosterone,17-hydroxyprogesterone, and DHEA for pt?

## 2024-01-15 NOTE — TELEPHONE ENCOUNTER
From: Margy Lawson  To: Marsha Rg  Sent: 1/14/2024 4:52 PM CST  Subject: Menstrual cycle prolong. Normal 30-31Days_Has been 38 days without period    Hi Dr. Rg,    Happy new year! I have a question about my menstrual cycle. I have been having a normal 30-31 day cycle for many years but last month it was 34 days and it has been 38 days since the last period but I have not bleed. HCG test result is negative. Should I be concern? Thank you.  Margy

## 2024-01-17 ENCOUNTER — LAB ENCOUNTER (OUTPATIENT)
Dept: LAB | Age: 44
End: 2024-01-17
Attending: OBSTETRICS & GYNECOLOGY
Payer: COMMERCIAL

## 2024-01-17 DIAGNOSIS — N92.6 IRREGULAR MENSES: ICD-10-CM

## 2024-01-17 LAB
CHOLEST SERPL-MCNC: 145 MG/DL (ref ?–200)
DHEA-S SERPL-MCNC: 284.6 UG/DL
EST. AVERAGE GLUCOSE BLD GHB EST-MCNC: 103 MG/DL (ref 68–126)
ESTRADIOL SERPL-MCNC: 25.1 PG/ML
FASTING PATIENT GLUCOSE ANSWER: YES
FASTING PATIENT LIPID ANSWER: YES
FSH SERPL-ACNC: 8.2 MIU/ML
GLUCOSE BLD-MCNC: 93 MG/DL (ref 70–99)
HBA1C MFR BLD: 5.2 % (ref ?–5.7)
HDLC SERPL-MCNC: 77 MG/DL (ref 40–59)
INSULIN SERPL-ACNC: 8.7 MU/L (ref 3–25)
LDLC SERPL CALC-MCNC: 57 MG/DL (ref ?–100)
LH SERPL-ACNC: 3.7 MIU/ML
NONHDLC SERPL-MCNC: 68 MG/DL (ref ?–130)
PROLACTIN SERPL-MCNC: 8.7 NG/ML
TRIGL SERPL-MCNC: 50 MG/DL (ref 30–149)
TSI SER-ACNC: 1.96 MIU/ML (ref 0.36–3.74)
VLDLC SERPL CALC-MCNC: 7 MG/DL (ref 0–30)

## 2024-01-17 PROCEDURE — 83036 HEMOGLOBIN GLYCOSYLATED A1C: CPT

## 2024-01-17 PROCEDURE — 80061 LIPID PANEL: CPT

## 2024-01-17 PROCEDURE — 83001 ASSAY OF GONADOTROPIN (FSH): CPT

## 2024-01-17 PROCEDURE — 82670 ASSAY OF TOTAL ESTRADIOL: CPT

## 2024-01-17 PROCEDURE — 84146 ASSAY OF PROLACTIN: CPT

## 2024-01-17 PROCEDURE — 84410 TESTOSTERONE BIOAVAILABLE: CPT

## 2024-01-17 PROCEDURE — 82947 ASSAY GLUCOSE BLOOD QUANT: CPT

## 2024-01-17 PROCEDURE — 83002 ASSAY OF GONADOTROPIN (LH): CPT

## 2024-01-17 PROCEDURE — 84443 ASSAY THYROID STIM HORMONE: CPT

## 2024-01-17 PROCEDURE — 82627 DEHYDROEPIANDROSTERONE: CPT

## 2024-01-17 PROCEDURE — 83525 ASSAY OF INSULIN: CPT

## 2024-01-19 LAB
SEX HORM BIND GLOB: 33.1 NMOL/L
TESTOST % FREE+WEAK BND: 12.2 %
TESTOST FREE+WEAK BND: 3.1 NG/DL
TESTOSTERONE TOT /MS: 25.1 NG/DL

## 2024-09-24 ENCOUNTER — OFFICE VISIT (OUTPATIENT)
Dept: OBGYN CLINIC | Facility: CLINIC | Age: 44
End: 2024-09-24
Payer: COMMERCIAL

## 2024-09-24 VITALS
BODY MASS INDEX: 24.13 KG/M2 | SYSTOLIC BLOOD PRESSURE: 124 MMHG | HEIGHT: 66 IN | WEIGHT: 150.13 LBS | DIASTOLIC BLOOD PRESSURE: 76 MMHG

## 2024-09-24 DIAGNOSIS — Z12.31 ENCOUNTER FOR SCREENING MAMMOGRAM FOR BREAST CANCER: Primary | ICD-10-CM

## 2024-09-24 DIAGNOSIS — Z13.0 SCREENING, IRON DEFICIENCY ANEMIA: ICD-10-CM

## 2024-09-24 PROCEDURE — 99212 OFFICE O/P EST SF 10 MIN: CPT | Performed by: OBSTETRICS & GYNECOLOGY

## 2024-09-24 NOTE — PROGRESS NOTES
Chief Complaint   Patient presents with    Gyn Exam         Margy Lawson is a 44 year old female who presents for consult regarding IVF.    LMP: 2024.    Menses regular: yes.    Menstrual flow normal: yes.    Birth control or HRT: 0.   Refill 0  Last Pap Smear: 2023 . Any history of abnormal paps: No hx abn   Gardasil:(age 11-46 y/o) n/a.   Any medication refills needed today?: no    Problems/concerns: Patient needs mammogram. Going through IVF and would like to know if rg would effect her egg retrieval      Next Appt: n/a        Immunization History   Administered Date(s) Administered    Covid-19 Vaccine Moderna Bivalent 50mcg/0.5mL 12+ years 10/20/2022    Covid-19 Vaccine Pfizer 30 mcg/0.3 ml 2021, 2021, 10/28/2021    HPV (Gardasil) 2008, 2008    TDAP 2019, 2019         Current Outpatient Medications:     miSOPROStol (CYTOTEC) 200 MCG Oral Tab, Take 1 tablet (200 mcg total) by mouth 4 (four) times daily. (Patient not taking: Reported on 2023), Disp: 8 tablet, Rfl: 0    progesterone 200 MG Oral Cap, Take 1 capsule (200 mg total) by mouth daily. (Patient not taking: Reported on 2023), Disp: 90 capsule, Rfl: 1    cyanocobalamin 100 MCG Oral Tab, Take 2.5 tablets (250 mcg total) by mouth daily., Disp: , Rfl:     Cholecalciferol (VITAMIN D3) 1.25 MG (42378 UT) Oral Cap, Take by mouth., Disp: , Rfl:     Allergies   Allergen Reactions    Insect Parts SHORTNESS OF BREATH and SWELLING     COCKROACH    Sulfa Antibiotics SHORTNESS OF BREATH     touching tabs while mixing meds ????  Other reaction(s): throat closed  Believes it was a sulfa drug, unsure, just from touching it her throat closed, fixed with benedryl before going to hospital  touching tabs while mixing meds ????    Latex RASH     gloves       OB History    Para Term  AB Living   1 0 0 0 1 0   SAB IAB Ectopic Multiple Live Births   1 0 0 0 0      # Outcome Date GA Lbr Arsalan/2nd Weight Sex  Type Anes PTL Lv   1 SAB 03/2023               Past Medical History:    Abnormal uterine bleeding       No past surgical history on file.    Family History   Problem Relation Age of Onset    Heart Attack Father 63    Hypertension Father     No Known Problems Mother         Breast tissue removal possibly cancerous cells    Cancer Maternal Grandfather     Cancer Maternal Grandmother     Cancer Paternal Grandmother     Pancreatic Cancer Paternal Grandmother         Tobacco  Allergies  Soc Hx        Social History     Socioeconomic History    Marital status: Single     Spouse name: Not on file    Number of children: Not on file    Years of education: Not on file    Highest education level: Not on file   Occupational History    Not on file   Tobacco Use    Smoking status: Never     Passive exposure: Never    Smokeless tobacco: Never   Vaping Use    Vaping status: Never Used   Substance and Sexual Activity    Alcohol use: Yes     Comment: Occasionally    Drug use: Never    Sexual activity: Yes     Partners: Male   Other Topics Concern    Not on file   Social History Narrative    Not on file     Social Determinants of Health     Financial Resource Strain: Not on file   Food Insecurity: Not on file   Transportation Needs: Not on file   Physical Activity: Not on file   Stress: Not on file   Social Connections: Not on file   Housing Stability: Not on file     /76   Ht 5' 6\" (1.676 m)   Wt 150 lb 2.1 oz (68.1 kg)   LMP 08/27/2024 (Exact Date)   Wt Readings from Last 3 Encounters:   09/24/24 150 lb 2.1 oz (68.1 kg)   12/13/23 153 lb 8 oz (69.6 kg)   03/17/23 152 lb 3.2 oz (69 kg)     Health Maintenance   Topic Date Due    Mammogram  Never done    Annual Depression Screening  01/01/2024    Annual Physical  01/06/2024    HTN: BP Follow-Up  01/13/2024    COVID-19 Vaccine (5 - 2023-24 season) 09/01/2024    Influenza Vaccine (1) 10/01/2024    Pap Smear  12/13/2026    DTaP,Tdap,and Td Vaccines (3 - Td or Tdap) 07/29/2029     Pneumococcal Vaccine: Birth to 64yrs  Aged Out         Review of Systems   General: Present- Feeling well.  Cardiovascular: Not Present- Chest Pain, Elevated Blood Pressure, Leg Pain and/or Swelling and Shortness of Breath.  Gastrointestinal: Not Present- Nausea and Vomiting.  Female Genitourinary: Not Present- Discharge, Dysmenorrhea, Dysuria, Excessive Menstrual Bleeding and Pelvic Pain.  Musculoskeletal: Not Present- Leg Cramps and Swelling of Extremities.  Neurological: Not Present- Headaches.  Psychiatric: Not Present- Anxiety and Depression.  All other systems negative       Physical Exam   The physical exam findings are as follows:       General   Mental Status - Alert. General Appearance - Well Developed/Well Nourished/No acute distress/ NC/AT.      Note: More than 50% of this visit was spent in counseling or coordinating care for the following reason Planning for IVF  .     1. Encounter for screening mammogram for breast cancer    2. Screening, iron deficiency anemia

## 2024-10-04 ENCOUNTER — TELEPHONE (OUTPATIENT)
Dept: OBGYN CLINIC | Facility: CLINIC | Age: 44
End: 2024-10-04

## 2024-10-16 ENCOUNTER — TELEPHONE (OUTPATIENT)
Dept: OBGYN CLINIC | Facility: CLINIC | Age: 44
End: 2024-10-16

## 2024-10-16 NOTE — TELEPHONE ENCOUNTER
Rep from a local imaging center calling to advise that a fax is being sent requesting Dr Rg's signature to order a breast ultrasound guided biopsy. Please advise.

## 2024-11-07 ENCOUNTER — OFFICE VISIT (OUTPATIENT)
Dept: OBGYN CLINIC | Facility: CLINIC | Age: 44
End: 2024-11-07
Payer: COMMERCIAL

## 2024-11-07 VITALS
SYSTOLIC BLOOD PRESSURE: 125 MMHG | HEIGHT: 66 IN | WEIGHT: 151.38 LBS | DIASTOLIC BLOOD PRESSURE: 76 MMHG | BODY MASS INDEX: 24.33 KG/M2

## 2024-11-07 DIAGNOSIS — R10.2 PELVIC PAIN: Primary | ICD-10-CM

## 2024-11-07 NOTE — PROGRESS NOTES
Chief Complaint   Patient presents with    Gyn Exam     Pt states began feeling pelvic pain after period         Margy Lawson is a 44 year old female who presents for pelvic pain.    LMP: 10/19/24.    Menses regular: 31-33.    Menstrual flow normal: heavy.    Birth control or HRT: none.   Refill none  Last Pap Smear: 23 (normal) . Any history of abnormal paps: none   Gardasil:(age 9-44 y/o) .   Any medication refills needed today?: none    Problems/concerns: pelvic pain after period.              Immunization History   Administered Date(s) Administered    Covid-19 Vaccine Moderna Bivalent 50mcg/0.5mL 12+ years 10/20/2022    Covid-19 Vaccine Pfizer 30 mcg/0.3 ml 2021, 2021, 10/28/2021    HPV (Gardasil) 2008, 2008    TDAP 2019, 2019         Current Outpatient Medications:     cyanocobalamin 100 MCG Oral Tab, Take 2.5 tablets (250 mcg total) by mouth daily., Disp: , Rfl:     Cholecalciferol (VITAMIN D3) 1.25 MG (06884 UT) Oral Cap, Take by mouth., Disp: , Rfl:     Allergies[1]    OB History    Para Term  AB Living   1 0 0 0 1 0   SAB IAB Ectopic Multiple Live Births   1 0 0 0 0      # Outcome Date GA Lbr Arsalan/2nd Weight Sex Type Anes PTL Lv   1 SAB 2023               Past Medical History:    Abnormal uterine bleeding       No past surgical history on file.    Family History   Problem Relation Age of Onset    Heart Attack Father 63    Hypertension Father     No Known Problems Mother         Breast tissue removal possibly cancerous cells    Cancer Maternal Grandfather     Cancer Maternal Grandmother     Cancer Paternal Grandmother     Pancreatic Cancer Paternal Grandmother         Tobacco  Allergies  Meds         Social History     Socioeconomic History    Marital status: Single     Spouse name: Not on file    Number of children: Not on file    Years of education: Not on file    Highest education level: Not on file   Occupational History    Not on file    Tobacco Use    Smoking status: Never     Passive exposure: Never    Smokeless tobacco: Never   Vaping Use    Vaping status: Never Used   Substance and Sexual Activity    Alcohol use: Yes     Comment: Occasionally    Drug use: Never    Sexual activity: Yes     Partners: Male   Other Topics Concern    Not on file   Social History Narrative    Not on file     Social Drivers of Health     Financial Resource Strain: Not on file   Food Insecurity: Not on file   Transportation Needs: Not on file   Physical Activity: Not on file   Stress: Not on file   Social Connections: Not on file   Housing Stability: Not on file     /76   Ht 5' 6\" (1.676 m)   Wt 151 lb 6.4 oz (68.7 kg)   LMP 10/19/2024 (Exact Date)   BMI 24.44 kg/m²     Wt Readings from Last 3 Encounters:   11/07/24 151 lb 6.4 oz (68.7 kg)   09/24/24 150 lb 2.1 oz (68.1 kg)   12/13/23 153 lb 8 oz (69.6 kg)         Health Maintenance   Topic Date Due    Influenza Vaccine (1) 08/01/2021    Screen for Cervical Cancer 11/05/2021    DTaP,Tdap and Td Vaccines (3 - Td or Tdap) 03/18/2025    Hepatitis C Screening Completed    HIV Screening Completed    COVID-19 Vaccine Completed     Review of Systems   General: Present- Feeling well. Not Present- Chills, Fever, Weight Gain and Weight Loss.  HEENT: Not Present- Headache and Sore Throat.  Respiratory: Not Present- Cough, Difficulty Breathing, Hemoptysis and Sputum Production.  Cardiovascular: Not Present- Chest Pain, Elevated Blood Pressure, Fainting / Blacking Out and Shortness of Breath.  Gastrointestinal: Not Present- Constipation, Diarrhea, Nausea and Vomiting.  Female Genitourinary: Not Present- Discharge, Dysuria and Frequency.  Musculoskeletal: Not Present- Leg Cramps and Swelling of Extremities.  Neurological: Not Present- Dizziness and Headaches.  Psychiatric: Not Present- Anxiety and Depression.  Endocrine: Not Present- Appetite Changes, Hair Changes and Thyroid Problems.  Hematology: Not Present- Easy  Bruising and Excessive bleeding.  All other systems negative     Physical Exam The physical exam findings are as follows:     General   Mental Status - Alert. General Appearance - Cooperative. Orientation - Oriented X4. Build & Nutrition - Well nourished.      Abdomen   Inspection: Inspection of the abdomen reveals - No Hernias. Incisional scars - no incisional scars.  Palpation/Percussion: Palpation and Percussion of the abdomen reveal - diffusely Tender and No Palpable abdominal masses.  Liver: - Normal.  Auscultation: Auscultation of the abdomen reveals - Bowel sounds normal.   +CVA tenderness     Female Genitourinary     External Genitalia   Perineum - Normal. Bartholin's Gland - Bilateral - Normal. Clitoris - Normal.  Introitus: Characteristics - No Cystocele, Enterocele or Rectocele. Discharge - None.  Labia Majora: Lesions - Bilateral - None. Characteristics - Bilateral - Normal.  Labia Minora: Lesions - Bilateral - None. Characteristics - Bilateral - Normal.  Urethra: Characteristics - Normal. Discharge - None.  Ovett Gland - Bilateral - Normal.  Vulva: Characteristics - Normal. Lesions - None.    Speculum & Bimanual   Vagina:   Vaginal Wall: - Normal.  Vaginal Lesions - None. Vaginal Mucosa - Normal.  Cervix: Characteristics - No Motion tenderness. Discharge - None.  Uterus: Characteristics - Normal. Position - Midposition.  Adnexa: Characteristics - Bilateral - Normal. Masses - No Adnexal Masses.    Rectal   Anorectal Exam: External - normal external exam.    Peripheral Vascular   Upper Extremity:   Palpation: - Pulses bilaterally normal.  Lower Extremity: Inspection - Bilateral - Inspection Normal.  Palpation: Edema - Bilateral - No edema.    Neurologic   Mental Status: - Normal.    Lymphatic  General Lymphatics   Description - Normal .     Location: Transabdominal   Transvaginal x    Gyn Data: Uterine Size wnls       Uterine Lining normal trilayer stripe       Uterus wnls Y/N y      Adnexa wnls Y/N  bilateral cystic ovaries R>L no free fluid       Adnexa       Right       Left     Sonohysterography bilateral cystic ovaries normal post retrieval appearance     Impression: schedule hysteroscopy.         1. Pelvic pain [R10.2]                    [1]   Allergies  Allergen Reactions    Insect Parts SHORTNESS OF BREATH and SWELLING     COCKROACH    Sulfa Antibiotics SHORTNESS OF BREATH     touching tabs while mixing meds ????  Other reaction(s): throat closed  Believes it was a sulfa drug, unsure, just from touching it her throat closed, fixed with benedryl before going to hospital  touching tabs while mixing meds ????    Latex RASH     gloves

## 2024-11-12 ENCOUNTER — LAB ENCOUNTER (OUTPATIENT)
Dept: LAB | Age: 44
End: 2024-11-12
Attending: OBSTETRICS & GYNECOLOGY
Payer: COMMERCIAL

## 2024-11-12 ENCOUNTER — TELEPHONE (OUTPATIENT)
Dept: OBGYN CLINIC | Facility: CLINIC | Age: 44
End: 2024-11-12

## 2024-11-12 DIAGNOSIS — R10.2 PELVIC PAIN: ICD-10-CM

## 2024-11-12 LAB
BILIRUB UR QL STRIP.AUTO: NEGATIVE
COLOR UR AUTO: YELLOW
GLUCOSE UR STRIP.AUTO-MCNC: NORMAL MG/DL
KETONES UR STRIP.AUTO-MCNC: NEGATIVE MG/DL
LEUKOCYTE ESTERASE UR QL STRIP.AUTO: 250
NITRITE UR QL STRIP.AUTO: NEGATIVE
PH UR STRIP.AUTO: 6.5 [PH] (ref 5–8)
PROT UR STRIP.AUTO-MCNC: NEGATIVE MG/DL
RBC UR QL AUTO: NEGATIVE
SP GR UR STRIP.AUTO: 1.02 (ref 1–1.03)
UROBILINOGEN UR STRIP.AUTO-MCNC: NORMAL MG/DL

## 2024-11-12 PROCEDURE — 81001 URINALYSIS AUTO W/SCOPE: CPT

## 2024-11-12 PROCEDURE — 87086 URINE CULTURE/COLONY COUNT: CPT

## 2024-11-12 NOTE — TELEPHONE ENCOUNTER
Name and  verified. Pt informed that urinalysis was ordered at office visit, but pt needs to provide a sample at the lab for the test to be completed. Pt verbalized understanding. Lab locations and hours provided.

## 2024-11-12 NOTE — TELEPHONE ENCOUNTER
Patient asking about results from 11/5  Visit summary does not show urine test, but patient asking since gave sample at office, but no results yet.    Pls advise

## 2024-11-13 ENCOUNTER — TELEPHONE (OUTPATIENT)
Dept: OBGYN CLINIC | Facility: CLINIC | Age: 44
End: 2024-11-13

## 2024-11-13 ENCOUNTER — PATIENT MESSAGE (OUTPATIENT)
Dept: OBGYN CLINIC | Facility: CLINIC | Age: 44
End: 2024-11-13

## 2024-11-14 ENCOUNTER — TELEPHONE (OUTPATIENT)
Dept: OBGYN CLINIC | Facility: CLINIC | Age: 44
End: 2024-11-14

## 2024-11-14 ENCOUNTER — APPOINTMENT (OUTPATIENT)
Dept: CT IMAGING | Age: 44
End: 2024-11-14
Attending: PHYSICIAN ASSISTANT
Payer: COMMERCIAL

## 2024-11-14 ENCOUNTER — HOSPITAL ENCOUNTER (OUTPATIENT)
Age: 44
Discharge: HOME | End: 2024-11-14
Payer: COMMERCIAL

## 2024-11-14 VITALS
RESPIRATION RATE: 18 BRPM | HEIGHT: 66 IN | OXYGEN SATURATION: 100 % | SYSTOLIC BLOOD PRESSURE: 116 MMHG | TEMPERATURE: 98 F | WEIGHT: 148 LBS | BODY MASS INDEX: 23.78 KG/M2 | HEART RATE: 69 BPM | DIASTOLIC BLOOD PRESSURE: 77 MMHG

## 2024-11-14 DIAGNOSIS — N23 RENAL COLIC: Primary | ICD-10-CM

## 2024-11-14 DIAGNOSIS — R93.89 ABNORMAL FINDING ON CT SCAN: ICD-10-CM

## 2024-11-14 DIAGNOSIS — R31.9 HEMATURIA, UNSPECIFIED TYPE: ICD-10-CM

## 2024-11-14 LAB
#MXD IC: 0.3 X10ˆ3/UL (ref 0.1–1)
B-HCG UR QL: NEGATIVE
BILIRUB UR QL STRIP: NEGATIVE
BUN BLD-MCNC: 15 MG/DL (ref 7–18)
CHLORIDE BLD-SCNC: 103 MMOL/L (ref 98–112)
CLARITY UR: CLEAR
CO2 BLD-SCNC: 25 MMOL/L (ref 21–32)
COLOR UR: YELLOW
CREAT BLD-MCNC: 0.7 MG/DL
EGFRCR SERPLBLD CKD-EPI 2021: 111 ML/MIN/1.73M2 (ref 60–?)
GLUCOSE BLD-MCNC: 94 MG/DL (ref 70–99)
GLUCOSE UR STRIP-MCNC: NEGATIVE MG/DL
HCT VFR BLD AUTO: 44.7 %
HCT VFR BLD CALC: 47 %
HGB BLD-MCNC: 14.7 G/DL
ISTAT IONIZED CALCIUM FOR CHEM 8: 1.24 MMOL/L (ref 1.12–1.32)
KETONES UR STRIP-MCNC: NEGATIVE MG/DL
LYMPHOCYTES # BLD AUTO: 1.8 X10ˆ3/UL (ref 1–4)
LYMPHOCYTES NFR BLD AUTO: 25.4 %
MCH RBC QN AUTO: 29.8 PG (ref 26–34)
MCHC RBC AUTO-ENTMCNC: 32.9 G/DL (ref 31–37)
MCV RBC AUTO: 90.7 FL (ref 80–100)
MIXED CELL %: 4.7 %
NEUTROPHILS # BLD AUTO: 4.9 X10ˆ3/UL (ref 1.5–7.7)
NEUTROPHILS NFR BLD AUTO: 69.9 %
NITRITE UR QL STRIP: NEGATIVE
PH UR STRIP: 5.5 [PH]
PLATELET # BLD AUTO: 278 X10ˆ3/UL (ref 150–450)
POTASSIUM BLD-SCNC: 4.1 MMOL/L (ref 3.6–5.1)
PROT UR STRIP-MCNC: NEGATIVE MG/DL
RBC # BLD AUTO: 4.93 X10ˆ6/UL
SODIUM BLD-SCNC: 140 MMOL/L (ref 136–145)
SP GR UR STRIP: >=1.03
UROBILINOGEN UR STRIP-ACNC: <2 MG/DL
WBC # BLD AUTO: 7 X10ˆ3/UL (ref 4–11)

## 2024-11-14 PROCEDURE — 81002 URINALYSIS NONAUTO W/O SCOPE: CPT

## 2024-11-14 PROCEDURE — 80047 BASIC METABLC PNL IONIZED CA: CPT

## 2024-11-14 PROCEDURE — 96374 THER/PROPH/DIAG INJ IV PUSH: CPT

## 2024-11-14 PROCEDURE — 99215 OFFICE O/P EST HI 40 MIN: CPT

## 2024-11-14 PROCEDURE — 74176 CT ABD & PELVIS W/O CONTRAST: CPT | Performed by: PHYSICIAN ASSISTANT

## 2024-11-14 PROCEDURE — 81025 URINE PREGNANCY TEST: CPT

## 2024-11-14 PROCEDURE — 85025 COMPLETE CBC W/AUTO DIFF WBC: CPT | Performed by: PHYSICIAN ASSISTANT

## 2024-11-14 PROCEDURE — 87086 URINE CULTURE/COLONY COUNT: CPT | Performed by: PHYSICIAN ASSISTANT

## 2024-11-14 RX ORDER — PHENAZOPYRIDINE HYDROCHLORIDE 200 MG/1
200 TABLET, FILM COATED ORAL
Qty: 10 TABLET | Refills: 0 | Status: SHIPPED | OUTPATIENT
Start: 2024-11-14

## 2024-11-14 RX ORDER — KETOROLAC TROMETHAMINE 30 MG/ML
15 INJECTION, SOLUTION INTRAMUSCULAR; INTRAVENOUS ONCE
Status: COMPLETED | OUTPATIENT
Start: 2024-11-14 | End: 2024-11-14

## 2024-11-14 RX ORDER — PHENAZOPYRIDINE HYDROCHLORIDE 200 MG/1
200 TABLET, FILM COATED ORAL
Qty: 10 TABLET | Refills: 0 | Status: CANCELLED | OUTPATIENT
Start: 2024-11-14

## 2024-11-14 NOTE — TELEPHONE ENCOUNTER
Pt name and  verified     Pt requests Dr. Rg review results prior to taking medication and would like to know if there is additional imaging needed to be done as pt has continuous lower back pain. Denies urinary symptoms. Pt aware we will route to Dr. Rg to review and provide recommendations. Pt verbalized understanding.

## 2024-11-14 NOTE — DISCHARGE INSTRUCTIONS
Please return to the ER/clinic if symptoms worsen. Follow-up with your PCP in 24-48 hours as needed.    Push fluids. Recommend lidocaine patches.  Take Motrin and/or Tylenol over-the-counter.  As discussed above follow-up with repeat imaging due to the pulmonary nodule.  If anything changes i.e. increased pain fever etc. dial 911 or go directly to the emergency room.  Otherwise follow-up with your providers for further evaluation and treatment.

## 2024-11-14 NOTE — ED PROVIDER NOTES
Patient Seen in: [facility]      History     Chief Complaint   Patient presents with    Abdomen/Flank Pain     Stated Complaint: lower back pain    Subjective:   HPI      44-year-old female here with complaint of bilateral flank pain right greater than left at about 8 out of 10.  Patient reports that it wraps around to the front.  Patient was seen by her PCP and had an ultrasound which detected cysts in her ovaries.  They ran a urine culture is no growth at 24 to 48 hours.  She was instructed to come and get a CT if symptoms worsen.  Patient denies chest pain, shortness of breath, cough, abdominal pain, nausea, vomiting or diarrhea.  Denies dysuria however has hematuria is tolerating p.o.  Afebrile.    Objective:     Past Medical History:    Abnormal uterine bleeding            The patient's medication list, past medical history and social history elements  as listed in today's nurse's notes are reviewed and agree.   The patient's family history is reviewed and is noncontributory to the presenting problem, except as indicated as above.     Past Surgical History:   Procedure Laterality Date    Biopsy Right 2024    breast    Other accessory  10/08/2024    Egg retrieval                Social History     Socioeconomic History    Marital status: Single   Tobacco Use    Smoking status: Never     Passive exposure: Never    Smokeless tobacco: Never   Vaping Use    Vaping status: Never Used   Substance and Sexual Activity    Alcohol use: Yes     Comment: Occasionally    Drug use: Never    Sexual activity: Yes     Partners: Male              Review of Systems    Positive for stated complaint: lower back pain  Other systems are as noted in HPI.  Constitutional and vital signs reviewed.      All other systems reviewed and negative except as noted above.    Physical Exam     ED Triage Vitals [11/14/24 1454]   /66   Pulse 89   Resp 18   Temp 98.2 °F (36.8 °C)   Temp src Oral   SpO2 97 %   O2 Device None  (Room air)       Current Vitals:   Vital Signs  BP: 116/77  Pulse: 69  Resp: 18  Temp: 98.2 °F (36.8 °C)  Temp src: Oral    Oxygen Therapy  SpO2: 100 %  O2 Device: None (Room air)        Physical Exam  Vitals and nursing note reviewed.   Constitutional:       Appearance: She is well-developed.   HENT:      Head: Normocephalic.      Right Ear: External ear normal.      Left Ear: External ear normal.      Nose: Nose normal.      Mouth/Throat:      Mouth: Mucous membranes are moist.   Eyes:      Extraocular Movements: Extraocular movements intact.      Conjunctiva/sclera: Conjunctivae normal.      Pupils: Pupils are equal, round, and reactive to light.   Cardiovascular:      Rate and Rhythm: Normal rate and regular rhythm.      Heart sounds: Normal heart sounds.   Pulmonary:      Effort: Pulmonary effort is normal.      Breath sounds: Normal breath sounds.   Abdominal:      General: Abdomen is protuberant. Bowel sounds are normal.      Palpations: Abdomen is soft.      Tenderness: There is no abdominal tenderness. There is right CVA tenderness and left CVA tenderness.   Musculoskeletal:      Cervical back: Normal range of motion and neck supple.   Skin:     General: Skin is warm.      Capillary Refill: Capillary refill takes less than 2 seconds.   Neurological:      General: No focal deficit present.      Mental Status: She is alert and oriented to person, place, and time.   Psychiatric:         Mood and Affect: Mood normal.         Behavior: Behavior normal.         Thought Content: Thought content normal.         Judgment: Judgment normal.           ED Course     Labs Reviewed   [facility] POCT URINALYSIS DIPSTICK - Abnormal; Notable for the following components:       Result Value    Blood, Urine Trace-Intact (*)     Leukocyte esterase urine Trace (*)     All other components within normal limits   POCT PREGNANCY URINE - Normal   POCT ISTAT CHEM8 CARTRIDGE - Normal   POCT CBC   URINE CULTURE, ROUTINE     I personally  reviewed the xray images and and saw these findings: CT nodule/non-obsructing renal calculi   CT ABDOMEN+PELVIS KIDNEYSTONE 2D RNDR(NO IV,NO ORAL)(CPT=74176)    Result Date: 11/14/2024  PROCEDURE:  CT ABDOMEN+PELVIS KIDNEYSTONE 2D RNDR(NO IV,NO ORAL)(CPT=74176)  COMPARISON:  None.  INDICATIONS:  lower back pain  TECHNIQUE:  Unenhanced multislice CT scanning from above the kidneys to below the urinary bladder.  2D rendering are generated on the CT scanner workstation to localize potential stones in the cranio-caudal plane.  Dose reduction techniques were used. Dose information is transmitted to the ACR (American College of Radiology) NRDR (National Radiology Data Registry) which includes the Dose Index Registry.  PATIENT STATED HISTORY: (As transcribed by Technologist)  Patient states to have bilateral flank pain and nausea for 3 weeks, she also has microscopic hematuria.    FINDINGS:  LUNG BASES:  Ground-glass nodular opacity within the right lobe measuring 1.0 x 0.9 cm.  3 mm left lobe pulmonary nodule on image 9. LIVER:  Normal in shape and contour but limited evaluation without contrast.  Multiple hepatic cysts are noted. BILIARY:   Gallbladder is unremarkable in appearance.  No intrahepatic biliary dilatation.. SPLEEN:  Normal.  No enlargement or focal lesion. PANCREAS:  No amrik-pancreatic inflammatory stranding ADRENALS:  Normal.  No mass or enlargement.  KIDNEYS:  The kidneys are symmetric in size and shape without evidence of hydronephrosis.  No obstructing urolithiasis.  Tiny nonobstructing right renal calculi.  Right renal cyst. BOWEL/MESENTERY:  Bowel is normal in caliber. No evidence of obstruction.  The appendix is normal in appearance. PELVIS:  Bladder wall thickening.  Calcified phleboliths in the pelvis.  No intrahepatic biliary dilatation.  Trace amount of free pelvic fluid.   AORTA/VASCULAR:  Aorta is normal in caliber. BONES:  No acute fractures.            CONCLUSION:  1. Bladder wall thickening.   Correlation with urinalysis is recommended. 2. Tiny nonobstructing right renal calculi.  3. Pulmonary nodules as described above with a ground-glass nodular opacity measuring 1 cm in the right lower lobe.The findings include a single, incidentally detected, ground-glass pulmonary nodule, measuring more than or equal to 6mm.  2017 guidelines from the Fleischner Society for the follow-up and management of incidentally detected indeterminate pulmonary nodules in persons at least 35 years of age depend on nodule size (average length and width) and underlying risk factors (including smoking and other risk factors). Please consider the following recommendations after clinical assessment of risk factors.  For solitary ground-glass nodules measuring more than or equal to 6mm:  CT in 6 to 12 months to confirm persistence, then CT every 2 years until 5 years.  *If nodule grows or becomes increasingly solid, consider resection.     LOCATION:  [place] [address]   Dictated by (CST): Ochoa Borrero MD on 11/14/2024 at 4:30 PM     Finalized by (CST): Ochoa Borrero MD on 11/14/2024 at 4:34 PM           NOTE: We dicussed finding on CT scan where she needs to follow-up with repeat imaging.  Patient is aware.  Patient states that she still uncomfortable in the right mid back region.  Will give a lidocaine patch.  There is no obstructing kidney stone etc.  Patient will contact her fertility specialist as well as PCP for further instruction.  Original culture grew out to no growth.  Will send out the an additional culture.     MDM       Clinical Impression: renal colic/R non-abstructing stone/abnormal CT findings  Course of Treatment:   Push fluids. Recommend lidocaine patches.  Take Motrin and/or Tylenol over-the-counter.  As discussed above follow-up with repeat imaging due to the pulmonary nodule.  If anything changes i.e. increased pain fever etc. dial 911 or go directly to the emergency room.  Otherwise follow-up with your providers for  further evaluation and treatment.  This case was discussed with the attending physician please see the attestation.     The patient is encouraged to return if any concerning symptoms arise. Additional verbal discharge instructions are given and discussed. Discharge medications are discussed. The patient is in good condition throughout the visit today and remains so upon discharge. I discuss the plan of care with the patient, who expresses understanding. All questions and concerns are addressed to the patient's satisfaction prior to discharge today.  Previous conversations with PCP and charts were reviewed.            Disposition and Plan     Clinical Impression:  1. Renal colic    2. Abnormal finding on CT scan    3. Hematuria, unspecified type         Disposition:  Discharge  11/14/2024  5:20 pm    Follow-up:  Marcella Johnson MD  [address] [place] [id number]  [phone]                Medications Prescribed:  Current Discharge Medication List              Supplementary Documentation:

## 2024-11-14 NOTE — ED INITIAL ASSESSMENT (HPI)
Lower abdominal  pain -   RLQ started on oct 28,  radiated to the left side.  Pt went to    to see her OBGYN  2 days ago  had US  dx  cyst in ovaries, had urine tested, urine cx done negative for UTI.   Pt was prescribed  pain  medication .   Pt was advised to go the  urgent care if still having pain   Bilateral back pain x 2 weeks.  Took ibuprofen , tylenol.  Denies fever, urinary issues,

## 2024-11-18 NOTE — TELEPHONE ENCOUNTER
No growth on ucx x 2 ok for pyridium to see if provides relief or can offer some estrace to see if her symptoms are more vaginally related. Need her feedback on symptoms     Marsha Rg MD

## 2024-11-18 NOTE — TELEPHONE ENCOUNTER
Pt states she is not having any vaginal or urinary symptoms at this time. Pt is continuing to have back and lower abdominal pain, as discussed at her recent appointment. Pt is asking what additional follow-up is needed to evaluate this pain.

## 2024-11-18 NOTE — TELEPHONE ENCOUNTER
Pt name and  verified     Pt informed to follow up with GI and PCP. Pt unable to hear and requested information be sent via [employer]. Will notify via [employer].

## 2024-11-18 NOTE — TELEPHONE ENCOUNTER
Would recommend GI evaluation and a visit to PCP  as pelvic organs and bladder not source.    Marsha Rg MD

## 2025-01-30 ENCOUNTER — LAB ENCOUNTER (OUTPATIENT)
Dept: LAB | Age: 45
End: 2025-01-30
Attending: OBSTETRICS & GYNECOLOGY
Payer: COMMERCIAL

## 2025-01-30 DIAGNOSIS — Z13.0 SCREENING, IRON DEFICIENCY ANEMIA: ICD-10-CM

## 2025-01-30 LAB
ALBUMIN SERPL-MCNC: 4.4 G/DL (ref 3.2–4.8)
ALBUMIN/GLOB SERPL: 1.6 {RATIO} (ref 1–2)
ALP LIVER SERPL-CCNC: 48 U/L
ALT SERPL-CCNC: 13 U/L
ANION GAP SERPL CALC-SCNC: 7 MMOL/L (ref 0–18)
AST SERPL-CCNC: 16 U/L (ref ?–34)
BILIRUB SERPL-MCNC: 0.8 MG/DL (ref 0.3–1.2)
BUN BLD-MCNC: 10 MG/DL (ref 9–23)
CALCIUM BLD-MCNC: 9.2 MG/DL (ref 8.7–10.6)
CHLORIDE SERPL-SCNC: 104 MMOL/L (ref 98–112)
CO2 SERPL-SCNC: 29 MMOL/L (ref 21–32)
CREAT BLD-MCNC: 0.71 MG/DL
EGFRCR SERPLBLD CKD-EPI 2021: 109 ML/MIN/1.73M2 (ref 60–?)
FASTING STATUS PATIENT QL REPORTED: YES
GLOBULIN PLAS-MCNC: 2.7 G/DL (ref 2–3.5)
GLUCOSE BLD-MCNC: 93 MG/DL (ref 70–99)
OSMOLALITY SERPL CALC.SUM OF ELEC: 289 MOSM/KG (ref 275–295)
POTASSIUM SERPL-SCNC: 3.9 MMOL/L (ref 3.5–5.1)
PROT SERPL-MCNC: 7.1 G/DL (ref 5.7–8.2)
SODIUM SERPL-SCNC: 140 MMOL/L (ref 136–145)

## 2025-01-30 PROCEDURE — 80053 COMPREHEN METABOLIC PANEL: CPT

## 2025-01-30 PROCEDURE — 83520 IMMUNOASSAY QUANT NOS NONAB: CPT

## 2025-01-30 PROCEDURE — 36415 COLL VENOUS BLD VENIPUNCTURE: CPT

## 2025-02-05 LAB — MULLERIAN AMH: 2.36 NG/ML

## 2025-05-07 ENCOUNTER — HOSPITAL ENCOUNTER (OUTPATIENT)
Dept: MAMMOGRAPHY | Age: 45
Discharge: HOME | End: 2025-05-07
Attending: OBSTETRICS & GYNECOLOGY
Payer: COMMERCIAL

## 2025-05-07 DIAGNOSIS — Z12.31 ENCOUNTER FOR SCREENING MAMMOGRAM FOR BREAST CANCER: ICD-10-CM

## 2025-05-07 PROCEDURE — 77067 SCR MAMMO BI INCL CAD: CPT | Performed by: OBSTETRICS & GYNECOLOGY

## 2025-05-07 PROCEDURE — 77063 BREAST TOMOSYNTHESIS BI: CPT | Performed by: OBSTETRICS & GYNECOLOGY

## 2025-05-27 ENCOUNTER — HOSPITAL ENCOUNTER (OUTPATIENT)
Dept: ULTRASOUND IMAGING | Age: 45
Discharge: HOME | End: 2025-05-27
Attending: OBSTETRICS & GYNECOLOGY
Payer: COMMERCIAL

## 2025-05-27 ENCOUNTER — HOSPITAL ENCOUNTER (OUTPATIENT)
Dept: MAMMOGRAPHY | Age: 45
Discharge: HOME | End: 2025-05-27
Attending: OBSTETRICS & GYNECOLOGY
Payer: COMMERCIAL

## 2025-05-27 DIAGNOSIS — R92.2 INCONCLUSIVE MAMMOGRAM: ICD-10-CM

## 2025-05-27 PROCEDURE — 76642 ULTRASOUND BREAST LIMITED: CPT | Performed by: OBSTETRICS & GYNECOLOGY

## 2025-05-27 PROCEDURE — 77061 BREAST TOMOSYNTHESIS UNI: CPT | Performed by: OBSTETRICS & GYNECOLOGY

## 2025-05-27 PROCEDURE — 77065 DX MAMMO INCL CAD UNI: CPT | Performed by: OBSTETRICS & GYNECOLOGY

## 2025-05-28 DIAGNOSIS — R92.30 DENSE BREASTS: Primary | ICD-10-CM

## 2025-05-28 NOTE — PROGRESS NOTES
Category C - The breasts are heterogeneously dense, which may obscure small masses.     Needs ABUS ultrasound   Marsha Rg MD

## 2025-06-05 ENCOUNTER — OFFICE VISIT (OUTPATIENT)
Dept: INTERNAL MEDICINE CLINIC | Facility: CLINIC | Age: 45
End: 2025-06-05
Payer: COMMERCIAL

## 2025-06-05 ENCOUNTER — LAB ENCOUNTER (OUTPATIENT)
Dept: LAB | Age: 45
End: 2025-06-05
Attending: INTERNAL MEDICINE
Payer: COMMERCIAL

## 2025-06-05 VITALS
HEIGHT: 64.88 IN | WEIGHT: 150.81 LBS | DIASTOLIC BLOOD PRESSURE: 64 MMHG | RESPIRATION RATE: 16 BRPM | SYSTOLIC BLOOD PRESSURE: 133 MMHG | TEMPERATURE: 98 F | HEART RATE: 84 BPM | OXYGEN SATURATION: 99 % | BODY MASS INDEX: 25.13 KG/M2

## 2025-06-05 DIAGNOSIS — M54.50 CHRONIC BILATERAL LOW BACK PAIN WITHOUT SCIATICA: Primary | ICD-10-CM

## 2025-06-05 DIAGNOSIS — F39 MOOD DISORDER: ICD-10-CM

## 2025-06-05 DIAGNOSIS — E55.9 VITAMIN D DEFICIENCY: ICD-10-CM

## 2025-06-05 DIAGNOSIS — G89.29 CHRONIC BILATERAL LOW BACK PAIN WITHOUT SCIATICA: Primary | ICD-10-CM

## 2025-06-05 DIAGNOSIS — N20.0 NEPHROLITHIASIS: ICD-10-CM

## 2025-06-05 DIAGNOSIS — L98.9 SKIN LESIONS, GENERALIZED: ICD-10-CM

## 2025-06-05 DIAGNOSIS — Z00.00 PREVENTATIVE HEALTH CARE: ICD-10-CM

## 2025-06-05 LAB
BASOPHILS # BLD AUTO: 0.02 X10(3) UL (ref 0–0.2)
BASOPHILS NFR BLD AUTO: 0.4 %
CHOLEST SERPL-MCNC: 154 MG/DL (ref ?–200)
EOSINOPHIL # BLD AUTO: 0.03 X10(3) UL (ref 0–0.7)
EOSINOPHIL NFR BLD AUTO: 0.6 %
ERYTHROCYTE [DISTWIDTH] IN BLOOD BY AUTOMATED COUNT: 12.1 %
EST. AVERAGE GLUCOSE BLD GHB EST-MCNC: 97 MG/DL (ref 68–126)
FASTING PATIENT LIPID ANSWER: NO
HBA1C MFR BLD: 5 % (ref ?–5.7)
HCT VFR BLD AUTO: 39.9 % (ref 35–48)
HDLC SERPL-MCNC: 61 MG/DL (ref 40–59)
HGB BLD-MCNC: 13.2 G/DL (ref 12–16)
IMM GRANULOCYTES # BLD AUTO: 0.02 X10(3) UL (ref 0–1)
IMM GRANULOCYTES NFR BLD: 0.4 %
LDLC SERPL CALC-MCNC: 67 MG/DL (ref ?–100)
LYMPHOCYTES # BLD AUTO: 1.38 X10(3) UL (ref 1–4)
LYMPHOCYTES NFR BLD AUTO: 28.5 %
MCH RBC QN AUTO: 30.4 PG (ref 26–34)
MCHC RBC AUTO-ENTMCNC: 33.1 G/DL (ref 31–37)
MCV RBC AUTO: 91.9 FL (ref 80–100)
MONOCYTES # BLD AUTO: 0.29 X10(3) UL (ref 0.1–1)
MONOCYTES NFR BLD AUTO: 6 %
NEUTROPHILS # BLD AUTO: 3.11 X10 (3) UL (ref 1.5–7.7)
NEUTROPHILS # BLD AUTO: 3.11 X10(3) UL (ref 1.5–7.7)
NEUTROPHILS NFR BLD AUTO: 64.1 %
NONHDLC SERPL-MCNC: 93 MG/DL (ref ?–130)
PLATELET # BLD AUTO: 276 10(3)UL (ref 150–450)
RBC # BLD AUTO: 4.34 X10(6)UL (ref 3.8–5.3)
T4 FREE SERPL-MCNC: 1.2 NG/DL (ref 0.8–1.7)
TRIGL SERPL-MCNC: 155 MG/DL (ref 30–149)
TSI SER-ACNC: 2.36 UIU/ML (ref 0.55–4.78)
VIT B12 SERPL-MCNC: 769 PG/ML (ref 211–911)
VIT D+METAB SERPL-MCNC: 33 NG/ML (ref 30–100)
VLDLC SERPL CALC-MCNC: 23 MG/DL (ref 0–30)
WBC # BLD AUTO: 4.9 X10(3) UL (ref 4–11)

## 2025-06-05 PROCEDURE — 84439 ASSAY OF FREE THYROXINE: CPT

## 2025-06-05 PROCEDURE — 80061 LIPID PANEL: CPT

## 2025-06-05 PROCEDURE — 36415 COLL VENOUS BLD VENIPUNCTURE: CPT

## 2025-06-05 PROCEDURE — 99204 OFFICE O/P NEW MOD 45 MIN: CPT | Performed by: INTERNAL MEDICINE

## 2025-06-05 PROCEDURE — 84443 ASSAY THYROID STIM HORMONE: CPT

## 2025-06-05 PROCEDURE — 82607 VITAMIN B-12: CPT

## 2025-06-05 PROCEDURE — 82306 VITAMIN D 25 HYDROXY: CPT

## 2025-06-05 PROCEDURE — 85025 COMPLETE CBC W/AUTO DIFF WBC: CPT

## 2025-06-05 PROCEDURE — 83036 HEMOGLOBIN GLYCOSYLATED A1C: CPT

## 2025-06-05 RX ORDER — MELOXICAM 7.5 MG/1
7.5 TABLET ORAL DAILY PRN
Qty: 30 TABLET | Refills: 0 | Status: SHIPPED | OUTPATIENT
Start: 2025-06-05

## 2025-06-05 NOTE — PROGRESS NOTES
Margy Lawson is a 45 year old female.   HPI:     Patient presents to discuss several issues and establish care.  Previously seen at an [facility] clinic.  She has been dealing with periodic lower back pain.  States symptoms started after she started IVF treatments - she has stopped IVF therapy but pain persists.  Pain is usually before and after her periods.  Across lower back.  Has followed up with her gynecologist for this - work up unremarkable.  Pain is across lower back. Was feeling pain after IVF injections as well.  Has been taking OTC ibuprofen - only helps temporarily.     Following with a psychologist for some mental health symptoms - psychologist feels patient may be having some PTSD symptoms.     Some blisters/bites - showed up in past 2-3 days. On both forearms.    Past medical, family, surgical and social history were reviewed as listed in the chart, and are unchanged from previous visit.    REVIEW OF SYSTEMS:   GENERAL/ const: no fevers/chills, no unintentional weight loss  SKIN: arm lesions as per HPI  EYES:no vision problems  HEENT: denies sinus pain or sinus tenderness  LUNGS: denies shortness of breath   CARDIOVASCULAR: denies chest pain  GI: denies nausea/emesis/ abdominal pain diarrhea constipation  : denies dysuria   MUSCULOSKELETAL: lower back pain as per HPI  NEURO: denies headaches  PSYCHIATRIC: mood symptoms  ENDOCRINE: no hot/cold intolerance  ALLERGY: Allergies[1]  PAST HISTORY:     Medications - Current[2]  Medical:  has a past medical history of Abnormal uterine bleeding (3/12/23).  Surgical:  has a past surgical history that includes biopsy (Right, 2024); other accessory (10/08/2024); and needle biopsy right (Right).  Family: family history includes Breast Cancer (age of onset: 58) in her maternal aunt; Cancer in her maternal grandfather, maternal grandmother, and paternal grandmother; Heart Attack (age of onset: 63) in her father; Hypertension in her father; No Known  Problems in her mother; Pancreatic Cancer in her paternal grandmother.  Social:  reports that she has never smoked. She has never been exposed to tobacco smoke. She has never used smokeless tobacco. She reports current alcohol use. She reports that she does not use drugs.  Wt Readings from Last 6 Encounters:   06/05/25 150 lb 12.8 oz (68.4 kg)   11/14/24 148 lb (67.1 kg)   11/07/24 151 lb 6.4 oz (68.7 kg)   09/24/24 150 lb 2.1 oz (68.1 kg)   12/13/23 153 lb 8 oz (69.6 kg)   03/17/23 152 lb 3.2 oz (69 kg)       EXAM:   /64 (BP Location: Left arm, Patient Position: Sitting, Cuff Size: adult)   Pulse 84   Temp 98.4 °F (36.9 °C) (Oral)   Resp 16   Ht 5' 4.88\" (1.648 m)   Wt 150 lb 12.8 oz (68.4 kg)   LMP 04/27/2025 (Exact Date)   SpO2 99%   BMI 25.19 kg/m²   GENERAL: Alert and oriented, well developed, well nourished,in no apparent distress  SKIN: 2 or 3 small maculopapular lesions on both arms  HEENT: atraumatic, PERRLA, EOMI, normal lid and conjunctiva  LUNGS: clear to auscultation bilaterally, no wheezing/rubs  CARDIO: RRR without murmurs.  No clubbing, cyanosis or edema.  GI: soft non tender nondistended no hepatosplenomegaly, bowel sounds throughout  NEURO: CN II-XII intact, 5/5 strength all extremities  MS: Full ROM, mild pain with rotation of back  PSYCH: pleasant, appropriate mood and affect  ASSESSMENT AND PLAN:   1. Chronic bilateral low back pain without sciatica  2. Nephrolithiasis  Patient with recurrent lower back pain symptoms.  Across the lower back.  No radicular pain in legs.  No saddle anesthesia or incontinence.  Has been worked up by her gynecologist.  Patient states symptoms started after she started IVF treatments.  No longer doing IVF but symptoms persist.  Mostly just before and after her period. She was seen in ED in November 2024, CT abdomen/pelvis showed small stones in right kidney, bladder inflammation, though urine culture was negative.  Unlikely these small kidney stones  would explain her symptoms but will have her follow up with Urology.  Will try meloxicam instead of PRN ibuprofen for her pain.  May consider Physiatry referral if symptoms persist.  - Meloxicam 7.5 MG Oral Tab; Take 1 tablet (7.5 mg total) by mouth daily as needed for Pain.  Dispense: 30 tablet; Refill: 0    3. Skin lesions, generalized  Patient has noticed some spots/lesions on arms past day or two.  Possible bug bites.  No target lesion appearance.  Recommend she try OTC hydrocortisone.  Advised to follow up if lesions persist/worsen/spread.    4. Vitamin D deficiency  On OTC supplementation.  Will check vitamin D levels.  - Vitamin D; Future    5. Mood disorder  Patient following with a psychologist for some mood symptoms.  Per patient, psychologist feels patient may be dealing with some PTSD symptoms.  Will enter [facility] referral so patient can be set up with [facility].  - TSH and Free T4; Future  - Vitamin B12; Future  - [facility] Referral - In Network    6. Preventative health care  Screening labs ordered.  - CBC With Differential With Platelet; Future  - Hemoglobin A1C; Future  - Lipid Panel; Future    Patient Care Team:  Marcella Johnson MD as PCP - General (Family Medicine)  Marsha Rg MD (OBSTETRICS & GYNECOLOGY)  Kathy Nguyen CMA Furlin, Joseph J, MD (OBSTETRICS & GYNECOLOGY)  Supriya Potts, GUIDO as Registered Nurse (Registered Nurse)  Regla, Generic Provider  The patient indicates understanding of these issues and agrees to the plan.  The patient is asked to return to clinic in 6-12 months for follow up on chronic issues, or earlier if acute issues arise.    Demetrice Eastman MD           [1]   Allergies  Allergen Reactions    Insect Parts SHORTNESS OF BREATH and SWELLING     COCKROACH    Seafood HIVES    Sulfa Antibiotics ANAPHYLAXIS and SHORTNESS OF BREATH     touching tabs while mixing meds ????  Other reaction(s): throat closed  Believes it was a sulfa drug, unsure, just from  touching it her throat closed, fixed with benedryl before going to hospital  touching tabs while mixing meds ????    American Cockroach OTHER (SEE COMMENTS)    Latex RASH     gloves   [2]   Current Outpatient Medications:     Multiple Vitamin (DAILY MULTIVITAMIN OR), Take 1 tablet by mouth in the morning., Disp: , Rfl:     Omega-3 Fatty Acids (FISH OIL OR), Take 1 capsule by mouth in the morning., Disp: , Rfl:     Meloxicam 7.5 MG Oral Tab, Take 1 tablet (7.5 mg total) by mouth daily as needed for Pain., Disp: 30 tablet, Rfl: 0    cyanocobalamin 100 MCG Oral Tab, Take 2.5 tablets (250 mcg total) by mouth daily., Disp: , Rfl:     Cholecalciferol (VITAMIN D3) 1.25 MG (46777 UT) Oral Cap, Take by mouth., Disp: , Rfl:

## 2025-06-05 NOTE — PATIENT INSTRUCTIONS
- Get blood tests done today  - Follow up with one of our Urology clinics for your CT scan findings (bladder inflammation, tiny kidney stones):  [address]  [address]  [place] [address]  [phone]    [address]  [address]  [place] [address]  [phone]    [address] [place] [id number]  [phone]    - Behavioral health referral entered.  They should reach out to you to set up Psychiatry appointment.  - Meloxicam (anti-inflammatory) prescribed for back pain.  Take as needed; take instead of ibuprofen.    It was a pleasure seeing you in the clinic today.  Thank you for choosing the [facility] [place] office for your healthcare needs. Please call at [phone] with any questions or concerns.    Demetrice Eastman MD

## 2025-06-11 ENCOUNTER — TELEPHONE (OUTPATIENT)
Age: 45
End: 2025-06-11

## 2025-06-11 NOTE — TELEPHONE ENCOUNTER
I am reaching out from the [facility] department, following up on an order from your provider's office to assist in connecting you with resources for care. If you would like to discuss this further, please give us a call back at [phone], or for more immediate assistance you can contact our 24-hour help line at [phone]. We look forward to hearing from you soon.

## 2025-07-03 ENCOUNTER — TELEPHONE (OUTPATIENT)
Age: 45
End: 2025-07-03

## 2025-07-03 NOTE — TELEPHONE ENCOUNTER
[facility] Grief group (currently no groups for July/August)   [phone]    [facility]  [address]  [place] [address]  Phone: [phone]  https://www.compassionatefriends.org/find-support/    [email or web address].[email or web address]    [email or web address]/[email or web address]    [email or web address]

## 2025-08-18 ENCOUNTER — TELEPHONE (OUTPATIENT)
Dept: INTERNAL MEDICINE CLINIC | Facility: CLINIC | Age: 45
End: 2025-08-18

## 2025-08-26 ENCOUNTER — OFFICE VISIT (OUTPATIENT)
Dept: NEUROLOGY | Facility: CLINIC | Age: 45
End: 2025-08-26

## 2025-08-26 VITALS
BODY MASS INDEX: 24.11 KG/M2 | SYSTOLIC BLOOD PRESSURE: 133 MMHG | RESPIRATION RATE: 16 BRPM | HEIGHT: 66 IN | OXYGEN SATURATION: 99 % | HEART RATE: 82 BPM | WEIGHT: 150 LBS | DIASTOLIC BLOOD PRESSURE: 84 MMHG

## 2025-08-26 DIAGNOSIS — R41.3 MEMORY LOSS: Primary | ICD-10-CM

## 2025-08-26 PROCEDURE — 99204 OFFICE O/P NEW MOD 45 MIN: CPT | Performed by: OTHER

## 2025-08-26 RX ORDER — ACETAMINOPHEN 325 MG/1
325 TABLET ORAL
COMMUNITY
Start: 2025-08-25 | End: 2025-09-01

## 2025-08-26 RX ORDER — IBUPROFEN 200 MG
200 TABLET ORAL
COMMUNITY
Start: 2025-08-25 | End: 2025-09-01

## (undated) NOTE — LETTER
AUTHORIZATION FOR SURGICAL OPERATION OR OTHER PROCEDURE    1. I hereby authorize Dr. Refugio Olivas, and [place] [employer] [facility] staff assigned to my case to perform the following operation and/or procedure at the [facility], [facility]:    _______________________________________________________________________________________________    polypectomy  _______________________________________________________________________________________________    2. My physician has explained the nature and purpose of the operation or other procedure, possible alternative methods of treatment, the risks involved, and the possibility of complication to me. I acknowledge that no guarantee has been made as to the result that may be obtained. 3.  I recognize that, during the course of this operation, or other procedure, unforseen conditions may necessitate additional or different procedure than those listed above. I, therefore, further authorize and request that the above named physician, his/her physician assistants or designees perform such procedures as are, in his/her professional opinion, necessary and desirable. 4.  Any tissue or organs removed in the operation or other procedure may be disposed of by and at the discretion of the [facility], [facility] and [facility]. 5.  I understand that in the event of a medical emergency, I will be transported by local paramedics to [facility] or other [facility] emergency department. 6.  I certify that I have read and fully understand the above consent to operation and/or other procedure. 7.  I acknowledge that my physician has explained sedation/analgesia administration to me including the risks and benefits. I consent to the administration of sedation/analgesia as may be necessary or desirable in the judgement of my physician. Witness signature: ___________________________________________________ Date:  ______/______/_____                    Time:  ________ A. M.  P.M. Patient Name:  ______________________________________________________  (please print)      Patient signature:  ___________________________________________________             Relationship to Patient:           []  Parent    Responsible person                          []  Spouse  In case of minor or                    [] Other  _____________   Incompetent name:  __________________________________________________                               (please print)      _____________      Responsible person  In case of minor or  Incompetent signature:  _______________________________________________    Statement of Physician  My signature below affirms that prior to the time of the procedure, I have explained to the patient and/or his/her guardian, the risks and benefits involved in the proposed treatment and any reasonable alternative to the proposed treatment. I have also explained the risks and benefits involved in the refusal of the proposed treatment and have answered the patient's questions.                         Date:  ______/______/_______  Provider                      Signature:  __________________________________________________________       Time:  ___________ A.M    P.M.